# Patient Record
Sex: MALE | Race: WHITE | Employment: OTHER | URBAN - METROPOLITAN AREA
[De-identification: names, ages, dates, MRNs, and addresses within clinical notes are randomized per-mention and may not be internally consistent; named-entity substitution may affect disease eponyms.]

---

## 2017-03-01 RX ORDER — LOSARTAN POTASSIUM AND HYDROCHLOROTHIAZIDE 12.5; 1 MG/1; MG/1
TABLET ORAL
Qty: 90 TABLET | Refills: 0 | Status: SHIPPED | OUTPATIENT
Start: 2017-03-01 | End: 2017-05-04 | Stop reason: SDUPTHER

## 2017-05-04 RX ORDER — LOSARTAN POTASSIUM AND HYDROCHLOROTHIAZIDE 12.5; 1 MG/1; MG/1
TABLET ORAL
Qty: 90 TABLET | Refills: 3 | Status: SHIPPED | OUTPATIENT
Start: 2017-05-04 | End: 2018-07-23 | Stop reason: SDUPTHER

## 2017-07-24 ENCOUNTER — OFFICE VISIT (OUTPATIENT)
Dept: FAMILY MEDICINE CLINIC | Age: 72
End: 2017-07-24
Payer: COMMERCIAL

## 2017-07-24 VITALS
SYSTOLIC BLOOD PRESSURE: 128 MMHG | WEIGHT: 181.2 LBS | TEMPERATURE: 98 F | OXYGEN SATURATION: 96 % | BODY MASS INDEX: 25.27 KG/M2 | DIASTOLIC BLOOD PRESSURE: 72 MMHG

## 2017-07-24 DIAGNOSIS — J02.0 ACUTE STREPTOCOCCAL PHARYNGITIS: Primary | ICD-10-CM

## 2017-07-24 DIAGNOSIS — J44.9 CHRONIC OBSTRUCTIVE PULMONARY DISEASE, UNSPECIFIED COPD TYPE (HCC): ICD-10-CM

## 2017-07-24 PROCEDURE — 99213 OFFICE O/P EST LOW 20 MIN: CPT

## 2017-07-24 RX ORDER — CEPHALEXIN 500 MG/1
500 CAPSULE ORAL 3 TIMES DAILY
Qty: 20 CAPSULE | Refills: 0 | Status: SHIPPED | OUTPATIENT
Start: 2017-07-24 | End: 2017-07-31

## 2017-07-24 RX ORDER — PREDNISONE 20 MG/1
TABLET ORAL
Qty: 18 TABLET | Refills: 0 | Status: SHIPPED | OUTPATIENT
Start: 2017-07-24 | End: 2017-08-03

## 2017-08-01 ENCOUNTER — TELEPHONE (OUTPATIENT)
Dept: FAMILY MEDICINE CLINIC | Age: 72
End: 2017-08-01

## 2017-08-01 RX ORDER — PREDNISONE 20 MG/1
TABLET ORAL
Qty: 18 TABLET | Refills: 0 | Status: SHIPPED | OUTPATIENT
Start: 2017-08-01 | End: 2017-08-11

## 2017-08-13 ASSESSMENT — ENCOUNTER SYMPTOMS
SHORTNESS OF BREATH: 0
SORE THROAT: 1
SWOLLEN GLANDS: 0
COUGH: 1
FREQUENT THROAT CLEARING: 0
HOARSE VOICE: 0
CHEST TIGHTNESS: 1
DIFFICULTY BREATHING: 0
NAUSEA: 0
SPUTUM PRODUCTION: 0
HEMOPTYSIS: 0

## 2017-08-13 ASSESSMENT — COPD QUESTIONNAIRES: COPD: 1

## 2017-10-05 ENCOUNTER — OFFICE VISIT (OUTPATIENT)
Dept: FAMILY MEDICINE CLINIC | Age: 72
End: 2017-10-05
Payer: COMMERCIAL

## 2017-10-05 VITALS
BODY MASS INDEX: 24.6 KG/M2 | DIASTOLIC BLOOD PRESSURE: 80 MMHG | HEIGHT: 72 IN | SYSTOLIC BLOOD PRESSURE: 138 MMHG | WEIGHT: 181.6 LBS | HEART RATE: 98 BPM | OXYGEN SATURATION: 94 %

## 2017-10-05 DIAGNOSIS — J45.901 ASTHMA WITH ACUTE EXACERBATION, UNSPECIFIED ASTHMA SEVERITY: ICD-10-CM

## 2017-10-05 DIAGNOSIS — R06.02 SOB (SHORTNESS OF BREATH): ICD-10-CM

## 2017-10-05 DIAGNOSIS — R05.9 COUGH: Primary | ICD-10-CM

## 2017-10-05 DIAGNOSIS — H57.9 VISUAL COMPLAINT: ICD-10-CM

## 2017-10-05 PROCEDURE — 99214 OFFICE O/P EST MOD 30 MIN: CPT | Performed by: FAMILY MEDICINE

## 2017-10-05 RX ORDER — PREDNISONE 20 MG/1
TABLET ORAL
Qty: 18 TABLET | Refills: 0 | Status: SHIPPED | OUTPATIENT
Start: 2017-10-05 | End: 2017-10-15

## 2017-10-05 RX ORDER — DOXYCYCLINE 100 MG/1
100 CAPSULE ORAL 2 TIMES DAILY WITH MEALS
Qty: 20 CAPSULE | Refills: 0 | Status: SHIPPED | OUTPATIENT
Start: 2017-10-05 | End: 2018-01-10 | Stop reason: ALTCHOICE

## 2017-10-05 ASSESSMENT — PATIENT HEALTH QUESTIONNAIRE - PHQ9
SUM OF ALL RESPONSES TO PHQ QUESTIONS 1-9: 0
1. LITTLE INTEREST OR PLEASURE IN DOING THINGS: 0
SUM OF ALL RESPONSES TO PHQ9 QUESTIONS 1 & 2: 0
2. FEELING DOWN, DEPRESSED OR HOPELESS: 0

## 2017-10-05 NOTE — MR AVS SNAPSHOT
Hypertension (Chronic)    Ankylosing spondylitis (HCC) (Chronic)    Pernicious anemia (Chronic)    Allergic rhinitis (Chronic)      Immunizations as of 10/5/2017     Name Date    Influenza, High Dose 9/12/2017, 10/29/2016, 9/17/2015    Pneumococcal 13-valent Conjugate ([id number]) 11/14/2016    Pneumococcal Polysaccharide ([id number]) 10/7/2015, 3/25/2010    Tdap (Boostrix, Adacel) 8/16/2012      Preventive Care        Date Due    One-time abdominal aortic aneurism (AAA) screening is recommended for all men between the age of 73-68 who have ever smoked 1945    Zoster Vaccine 3/6/2005    Cholesterol Screening 10/20/2020    Tetanus Combination Vaccine (2 - Td) 8/16/2022    Colonoscopy 2/13/2025            MyChart Signup           [employer] allows you to send messages to your doctor, view your test results, renew your prescriptions, schedule appointments, view visit notes, and more. How Do I Sign Up? 1. In your Internet browser, go to [email or web address]  2. Click on the Sign Up Now link in the Sign In box. You will see the New Member Sign Up page. 3. Enter your [employer] Access Code exactly as it appears below. You will not need to use this code after youve completed the sign-up process. If you do not sign up before the expiration date, you must request a new code. [employer] Access Code: [email or web address]  Expires: 12/4/2017 10:32 AM    4. Enter your Social Security Number ([id number]) and Date of Birth (mm/dd/yyyy) as indicated and click Submit. You will be taken to the next sign-up page. 5. Create a [employer]Slap ID. This will be your [employer] login ID and cannot be changed, so think of one that is secure and easy to remember. 6. Create a [employer] password. You can change your password at any time. 7. Enter your Password Reset Question and Answer. This can be used at a later time if you forget your password.

## 2017-10-05 NOTE — PROGRESS NOTES
Visit Information    Have you changed or started any medications since your last visit including any over-the-counter medicines, vitamins, or herbal medicines? no   Have you stopped taking any of your medications? Is so, why? -  no  Are you having any side effects from any of your medications? - no    Have you seen any other physician or provider since your last visit?  no   Have you had any other diagnostic tests since your last visit?  no   Have you been seen in the emergency room and/or had an admission in a hospital since we last saw you?  no   Have you had your routine dental cleaning in the past 6 months?  yes -      Do you have an active MyChart account? If no, what is the barrier?   No:     Patient Care Team:  Lisa Izquierdo MD as PCP - General (Family Medicine)  Gerard Duarte MD as Consulting Physician (Gastroenterology)    Medical History Review  Past Medical, Family, and Social History reviewed and does not contribute to the patient presenting condition    Health Maintenance   Topic Date Due    AAA screen  1945    Zostavax vaccine  03/06/2005    Lipid screen  10/20/2020    DTaP/Tdap/Td vaccine (2 - Td) 08/16/2022    Colon cancer screen colonoscopy  02/13/2025    Flu vaccine  Completed    Pneumococcal low/med risk  Completed    Hepatitis C screen  Completed

## 2017-10-05 NOTE — PROGRESS NOTES
Subjective:  Ryan Briceño presents for   Chief Complaint   Patient presents with    Cough     clear phlegm, a little sob.  Loss of Vision     patient stated that he had two episode of partial vision loss in his left eye, white floaty in left eye did not last long and it occurred while he was on his exercise bike and on episode happen when he was just sitting       Patient originally scheduled himself for AWV, but he has a list of complaints and coughing he wanted to discuss. Has been coughing for the last few weeks. He did recover form his bronchitis in august, so this is completely separate. No fevers. Fluids  Are good. Is not using the albuterol. But he does use the nebulizer and it helps, he thinks. Overtime he notices a gradula decline in lung function, but is mauro dong his normal chores around the house. Had 2 episodes where he literally had a white out in the center of his left visual field. Both time  Affected the left eye. Lasted one or two minutes. States he had the right hand shake once and the other time the left leg shook. He cannot relate it to anything he did or med. Patient Active Problem List   Diagnosis    Hypertension    [facility]ylosing sp[facility]yl[facility] ([facility] [address]    Pernicious anemia    Allergic rhinitis       Review of Systems:  · General: no significant weight changes. · Respiratory: no cough, pleuritic chest pain, dyspnea, or wheezing  · Cardiovascular: no pain, GAITAN, orthopnea, palpitations, or claudication  · Gastrointestinal: no chronic nausea, vomiting, heartburn, diarrhea, constipation, bloating, or abdominal pain. No bloody or black stools.     Objective:  Physical Exam   Vitals:   Vitals:    10/05/17 0959   BP: 138/80   Pulse: 98   SpO2: 94%   Weight: 181 lb 9.6 oz (82.4 kg)   Height: 6' (1.829 m)     Wt Readings from Last 3 Encounters:   10/05/17 181 lb 9.6 oz (82.4 kg)   07/24/17 181 lb 3.2 oz (82.2 kg)   11/14/16 183 lb (83 kg)     Ht Readings from Last 3 Standing Expiration Date:   10/5/2018     Scheduling Instructions:       This is to be done at the hospital     rx doxy and prednisone burst     Use the advair and albuterol daily

## 2017-10-06 LAB
ABSOLUTE BASO #: 0.1 K/UL (ref 0–0.1)
ABSOLUTE EOS #: 0.7 K/UL (ref 0.1–0.4)
ABSOLUTE LYMPH #: 1.4 K/UL (ref 0.8–5.2)
ABSOLUTE MONO #: 1 K/UL (ref 0.1–0.9)
ABSOLUTE NEUT #: 3.9 K/UL (ref 1.3–9.1)
ALBUMIN SERPL-MCNC: 3.7 G/DL (ref 3.2–5.3)
ALK PHOSPHATASE: 105 IU/L (ref 35–121)
ALT SERPL-CCNC: 22 IU/L (ref 5–59)
ANION GAP SERPL CALCULATED.3IONS-SCNC: 12 MMOL/L
AST SERPL-CCNC: 26 IU/L (ref 10–42)
BASOPHILS RELATIVE PERCENT: 1.1 %
BILIRUB SERPL-MCNC: 0.6 MG/DL (ref 0.2–1.3)
BUN BLDV-MCNC: 16 MG/DL (ref 10–25)
CALCIUM SERPL-MCNC: 9.2 MG/DL (ref 8.7–10.8)
CHLORIDE BLD-SCNC: 103 MMOL/L (ref 95–111)
CO2: 32 MMOL/L (ref 21–32)
CREAT SERPL-MCNC: 1.1 MG/DL (ref 0.7–1.5)
EGFR AFRICAN AMERICAN: 80
EGFR IF NONAFRICAN AMERICAN: 66
EOSINOPHILS RELATIVE PERCENT: 9.8 %
GLUCOSE: 89 MG/DL (ref 70–100)
HCT VFR BLD CALC: 46.1 % (ref 41.4–51)
HEMOGLOBIN: 15.5 G/DL (ref 13.8–17)
LYMPHOCYTE %: 19.1 %
MCH RBC QN AUTO: 33.6 PG (ref 27–34)
MCHC RBC AUTO-ENTMCNC: 33.6 G/DL (ref 31–36)
MCV RBC AUTO: 100 FL (ref 80–100)
MONOCYTES # BLD: 14.6 %
NEUTROPHILS RELATIVE PERCENT: 55.1 %
PDW BLD-RTO: 12.5 % (ref 10.8–14.8)
PLATELETS: 285 K/UL (ref 150–450)
POTASSIUM SERPL-SCNC: 4.7 MMOL/L (ref 3.5–5.4)
RBC: 4.61 M/UL (ref 4–5.5)
SEDIMENTATION RATE, ERYTHROCYTE: 7 MM/HR (ref 0–15)
SODIUM BLD-SCNC: 142 MMOL/L (ref 134–147)
TOTAL PROTEIN: 6.7 G/DL (ref 5.8–8)
WBC: 7.1 K/UL (ref 3.7–10.8)

## 2018-01-10 ENCOUNTER — OFFICE VISIT (OUTPATIENT)
Dept: FAMILY MEDICINE CLINIC | Age: 73
End: 2018-01-10
Payer: COMMERCIAL

## 2018-01-10 VITALS
DIASTOLIC BLOOD PRESSURE: 80 MMHG | HEART RATE: 94 BPM | BODY MASS INDEX: 24.68 KG/M2 | SYSTOLIC BLOOD PRESSURE: 140 MMHG | OXYGEN SATURATION: 95 % | WEIGHT: 182 LBS

## 2018-01-10 DIAGNOSIS — J44.9 CHRONIC OBSTRUCTIVE PULMONARY DISEASE, UNSPECIFIED COPD TYPE (HCC): Primary | ICD-10-CM

## 2018-01-10 PROCEDURE — 99213 OFFICE O/P EST LOW 20 MIN: CPT | Performed by: FAMILY MEDICINE

## 2018-01-10 RX ORDER — DOXYCYCLINE 100 MG/1
100 CAPSULE ORAL 2 TIMES DAILY WITH MEALS
Qty: 20 CAPSULE | Refills: 0 | Status: SHIPPED | OUTPATIENT
Start: 2018-01-10 | End: 2018-04-10 | Stop reason: ALTCHOICE

## 2018-01-10 RX ORDER — ALBUTEROL SULFATE 90 UG/1
2 AEROSOL, METERED RESPIRATORY (INHALATION) EVERY 6 HOURS PRN
Qty: 3 INHALER | Refills: 3 | Status: SHIPPED | OUTPATIENT
Start: 2018-01-10

## 2018-01-10 RX ORDER — PREDNISONE 20 MG/1
TABLET ORAL
Qty: 18 TABLET | Refills: 0 | Status: SHIPPED | OUTPATIENT
Start: 2018-01-10 | End: 2018-01-18 | Stop reason: SDUPTHER

## 2018-01-10 NOTE — PROGRESS NOTES
Subjective:  Mirna Philippe presents for   Chief Complaint   Patient presents with    Cough     w/ sputum x 4-5 days. Has taken mucinex.  Chest Congestion     wheezing.  Fatigue   fever 2-3 days ago. Increased sob. Last felt ok over a week ago. Patient Active Problem List   Diagnosis    Hypertension    Ankylosing spondylitis ([address]    Pernicious anemia    Allergic rhinitis       Review of Systems:  · General: no significant weight changes. · Respiratory: no cough, pleuritic chest pain, dyspnea, or wheezing  · Cardiovascular: no pain, GAITAN, orthopnea, palpitations, or claudication  · Gastrointestinal: no chronic nausea, vomiting, heartburn, diarrhea, constipation, bloating, or abdominal pain. No bloody or black stools. Objective:  Physical Exam   Vitals: BP (!) 140/80   Pulse 94   Wt 182 lb (82.6 kg)   SpO2 95%   BMI 24.68 kg/m²     Vitals:    01/10/18 1340   Weight: 182 lb (82.6 kg)     Wt Readings from Last 3 Encounters:   01/10/18 182 lb (82.6 kg)   10/05/17 181 lb 9.6 oz (82.4 kg)   07/24/17 181 lb 3.2 oz (82.2 kg)     Ht Readings from Last 3 Encounters:   10/05/17 6' (1.829 m)   11/14/16 5' 11\" (1.803 m)   08/02/16 5' 11\" (1.803 m)     Body mass index is 24.68 kg/m². Constitutional: He is oriented to person, place, and time. He appears well-developed and well-nourished and in no acute distress. Answers all my questions appropriately. Head: Normocephalic and atraumatic. Eyes:conjunctiva appear normal.  Right Ear: External ear normal. TM is clear  Left Ear: External ear normal. TM is clear  Nose: pink, non-edematous mucosa. No polyps. No septal deviation  Throat: no erythema, tonsillar hypertrophy or exudate. No ulcerations noted. Lips/Teeth/Gums all appear normal.  Neck: Normal range of motion. Neck supple. No tracheal deviation present. No abnormal lymphadenopathy. No JVD noted. Carotids are clear bilaterally. No thyroid masses noted. Heart: RRR without murmur.   No S3,

## 2018-01-18 RX ORDER — PREDNISONE 20 MG/1
TABLET ORAL
Qty: 18 TABLET | Refills: 0 | Status: SHIPPED | OUTPATIENT
Start: 2018-01-18 | End: 2018-04-10 | Stop reason: SDUPTHER

## 2018-01-18 NOTE — TELEPHONE ENCOUNTER
Next Visit Date:  No future appointments.     Health Maintenance   Topic Date Due    AAA screen  1945    Zostavax vaccine  03/06/2005    Potassium monitoring  10/05/2018    Creatinine monitoring  10/05/2018    Lipid screen  10/20/2020    DTaP/Tdap/Td vaccine (2 - Td) 08/16/2022    Colon cancer screen colonoscopy  02/13/2025    Flu vaccine  Completed    Pneumococcal low/med risk  Completed    Hepatitis C screen  Completed       No results found for: LABA1C          ( goal A1C is < 7)   No results found for: LABMICR  LDL Cholesterol (mg/dL)   Date Value   08/16/2012 90     LDL Calculated (mg/dL)   Date Value   10/20/2015 90       (goal LDL is <100)   AST (IU/L)   Date Value   10/05/2017 26     ALT (IU/L)   Date Value   10/05/2017 22     BUN (mg/dl)   Date Value   10/05/2017 16     BP Readings from Last 3 Encounters:   01/10/18 (!) 140/80   10/05/17 138/80   07/24/17 128/72          (goal 120/80)    All Future Testing planned in [facility]  Lab Frequency Next Occurrence   FULL PFT STUDY WITH PRE AND POST Once 11/04/2017               Patient Active Problem List:     Hypertension     Ankylosing spondylitis ([facility] [address]     Pernicious anemia     Allergic rhinitis     Chronic obstructive pulmonary disease ([facility] [address]

## 2018-04-10 ENCOUNTER — OFFICE VISIT (OUTPATIENT)
Dept: FAMILY MEDICINE CLINIC | Age: 73
End: 2018-04-10
Payer: COMMERCIAL

## 2018-04-10 VITALS
DIASTOLIC BLOOD PRESSURE: 80 MMHG | BODY MASS INDEX: 25.19 KG/M2 | TEMPERATURE: 97.8 F | OXYGEN SATURATION: 95 % | HEART RATE: 75 BPM | RESPIRATION RATE: 16 BRPM | WEIGHT: 186 LBS | HEIGHT: 72 IN | SYSTOLIC BLOOD PRESSURE: 122 MMHG

## 2018-04-10 DIAGNOSIS — J20.9 ACUTE BRONCHITIS, UNSPECIFIED ORGANISM: Primary | ICD-10-CM

## 2018-04-10 PROCEDURE — 96372 THER/PROPH/DIAG INJ SC/IM: CPT | Performed by: NURSE PRACTITIONER

## 2018-04-10 PROCEDURE — 99213 OFFICE O/P EST LOW 20 MIN: CPT | Performed by: NURSE PRACTITIONER

## 2018-04-10 RX ORDER — METHYLPREDNISOLONE SODIUM SUCCINATE 125 MG/2ML
125 INJECTION, POWDER, LYOPHILIZED, FOR SOLUTION INTRAMUSCULAR; INTRAVENOUS ONCE
Status: COMPLETED | OUTPATIENT
Start: 2018-04-10 | End: 2018-04-10

## 2018-04-10 RX ORDER — PREDNISONE 20 MG/1
TABLET ORAL
Qty: 18 TABLET | Refills: 0 | Status: SHIPPED | OUTPATIENT
Start: 2018-04-10 | End: 2018-04-20

## 2018-04-10 RX ORDER — DOXYCYCLINE 100 MG/1
100 CAPSULE ORAL 2 TIMES DAILY WITH MEALS
Qty: 20 CAPSULE | Refills: 0 | Status: SHIPPED | OUTPATIENT
Start: 2018-04-10 | End: 2018-10-03 | Stop reason: ALTCHOICE

## 2018-04-10 RX ORDER — IPRATROPIUM BROMIDE AND ALBUTEROL SULFATE 2.5; .5 MG/3ML; MG/3ML
1 SOLUTION RESPIRATORY (INHALATION) ONCE
Status: DISCONTINUED | OUTPATIENT
Start: 2018-04-10 | End: 2018-04-10

## 2018-04-10 RX ADMIN — METHYLPREDNISOLONE SODIUM SUCCINATE 125 MG: 125 INJECTION, POWDER, LYOPHILIZED, FOR SOLUTION INTRAMUSCULAR; INTRAVENOUS at 16:24

## 2018-04-10 ASSESSMENT — ENCOUNTER SYMPTOMS
SHORTNESS OF BREATH: 1
CHOKING: 0
STRIDOR: 0
SINUS PRESSURE: 0
SORE THROAT: 0
WHEEZING: 1
CHEST TIGHTNESS: 1
HEMOPTYSIS: 0
COLOR CHANGE: 0
RHINORRHEA: 0
APNEA: 0
TROUBLE SWALLOWING: 0
EYES NEGATIVE: 1
VOICE CHANGE: 0
COUGH: 1
FACIAL SWELLING: 0
SINUS PAIN: 0

## 2018-07-23 RX ORDER — LOSARTAN POTASSIUM AND HYDROCHLOROTHIAZIDE 12.5; 1 MG/1; MG/1
TABLET ORAL
Qty: 14 TABLET | Refills: 0 | Status: SHIPPED | OUTPATIENT
Start: 2018-07-23 | End: 2018-07-26 | Stop reason: SDUPTHER

## 2018-07-23 NOTE — TELEPHONE ENCOUNTER
Next Visit Date:  No future appointments.     Health Maintenance   Topic Date Due    AAA screen  1945    Flu vaccine (1) 09/01/2018    Lipid screen  10/20/2020    DTaP/Tdap/Td vaccine (2 - Td) 08/16/2022    Colon cancer screen colonoscopy  02/13/2025    Pneumococcal low/med risk  Completed    Shingles Vaccine  Completed    Hepatitis C screen  Completed       No results found for: LABA1C          ( goal A1C is < 7)   No results found for: LABMICR  LDL Cholesterol (mg/dL)   Date Value   08/16/2012 90     LDL Calculated (mg/dL)   Date Value   10/20/2015 90   09/04/2014 105       (goal LDL is <100)   AST (IU/L)   Date Value   10/05/2017 26     ALT (IU/L)   Date Value   10/05/2017 22     BUN (mg/dl)   Date Value   10/05/2017 16     BP Readings from Last 3 Encounters:   04/10/18 122/80   01/10/18 (!) 140/80   10/05/17 138/80          (goal 120/80)    All Future Testing planned in [facility]  Lab Frequency Next Occurrence   FULL PFT STUDY WITH PRE AND POST Once 11/04/2017   XR CHEST STANDARD (2 VW) Once 06/11/2018               Patient Active Problem List:     Hypertension     Ankylosing spondylitis ([address]     Pernicious anemia     Allergic rhinitis     Chronic obstructive pulmonary disease ([address]

## 2018-07-26 RX ORDER — LOSARTAN POTASSIUM AND HYDROCHLOROTHIAZIDE 12.5; 1 MG/1; MG/1
TABLET ORAL
Qty: 90 TABLET | Refills: 1 | Status: SHIPPED | OUTPATIENT
Start: 2018-07-26 | End: 2018-12-19 | Stop reason: SDUPTHER

## 2018-10-03 ENCOUNTER — OFFICE VISIT (OUTPATIENT)
Dept: FAMILY MEDICINE CLINIC | Age: 73
End: 2018-10-03
Payer: COMMERCIAL

## 2018-10-03 VITALS
HEART RATE: 74 BPM | OXYGEN SATURATION: 97 % | WEIGHT: 184.38 LBS | DIASTOLIC BLOOD PRESSURE: 84 MMHG | BODY MASS INDEX: 25.01 KG/M2 | SYSTOLIC BLOOD PRESSURE: 150 MMHG

## 2018-10-03 DIAGNOSIS — H53.19 VISUAL DISTORTION: ICD-10-CM

## 2018-10-03 DIAGNOSIS — J44.9 CHRONIC OBSTRUCTIVE PULMONARY DISEASE, UNSPECIFIED COPD TYPE (HCC): ICD-10-CM

## 2018-10-03 DIAGNOSIS — I10 ESSENTIAL HYPERTENSION: Primary | ICD-10-CM

## 2018-10-03 DIAGNOSIS — D51.0 PERNICIOUS ANEMIA: ICD-10-CM

## 2018-10-03 PROCEDURE — 99214 OFFICE O/P EST MOD 30 MIN: CPT | Performed by: FAMILY MEDICINE

## 2018-10-03 RX ORDER — ASPIRIN 81 MG/1
81 TABLET, CHEWABLE ORAL 2 TIMES DAILY
COMMUNITY

## 2018-10-03 ASSESSMENT — PATIENT HEALTH QUESTIONNAIRE - PHQ9
1. LITTLE INTEREST OR PLEASURE IN DOING THINGS: 0
SUM OF ALL RESPONSES TO PHQ QUESTIONS 1-9: 0
SUM OF ALL RESPONSES TO PHQ QUESTIONS 1-9: 0
2. FEELING DOWN, DEPRESSED OR HOPELESS: 0
SUM OF ALL RESPONSES TO PHQ9 QUESTIONS 1 & 2: 0

## 2018-10-03 NOTE — LETTER
Ordering Provider: Stefanie MELARA   Vitamin B12   Result Value Ref Range    Vitamin B-12 936.8 200 - 950 pg/mL      Comment:      Pathology [address] [facility], [address] [facility], [place]  : Thomas Smith. Zeb Resendez M.D.  Debra Byrnes No. [id number]   CAP Accreditation No. [id number]      Narrative    Ordering Provider: Angy Spicer. KELTON       Test results are essentially normal. Stable compared to previous     If you have any questions or concerns, please don't hesitate to call.     Sincerely,    [facility]

## 2018-10-05 LAB
ABSOLUTE BASO #: 0.1 K/UL (ref 0–0.1)
ABSOLUTE EOS #: 0.3 K/UL (ref 0.1–0.4)
ABSOLUTE LYMPH #: 1.7 K/UL (ref 0.8–5.2)
ABSOLUTE MONO #: 0.8 K/UL (ref 0.1–0.9)
ABSOLUTE NEUT #: 3.9 K/UL (ref 1.3–9.1)
ALBUMIN SERPL-MCNC: 3.9 G/DL (ref 3.5–5.2)
ALK PHOSPHATASE: 101 U/L (ref 39–118)
ALT SERPL-CCNC: 17 U/L (ref 5–50)
ANION GAP SERPL CALCULATED.3IONS-SCNC: 12 MEQ/L (ref 10–19)
AST SERPL-CCNC: 22 U/L (ref 9–50)
BASOPHILS RELATIVE PERCENT: 0.9 %
BILIRUB SERPL-MCNC: 0.4 MG/DL
BUN BLDV-MCNC: 14 MG/DL (ref 8–23)
CALCIUM SERPL-MCNC: 9.3 MG/DL (ref 8.5–10.5)
CHLORIDE BLD-SCNC: 100 MEQ/L (ref 95–107)
CHOLESTEROL/HDL RATIO: 3.3
CHOLESTEROL: 162 MG/DL
CO2: 28 MEQ/L (ref 19–31)
CREAT SERPL-MCNC: 1.2 MG/DL (ref 0.8–1.4)
EGFR AFRICAN AMERICAN: 69.1 ML/MIN/1.73 M2
EGFR IF NONAFRICAN AMERICAN: 59.6 ML/MIN/1.73 M2
EOSINOPHILS RELATIVE PERCENT: 4.4 %
GLUCOSE: 89 MG/DL (ref 70–99)
HCT VFR BLD CALC: 43.9 % (ref 41.4–51)
HDLC SERPL-MCNC: 49 MG/DL
HEMOGLOBIN: 15.7 G/DL (ref 13.8–17)
LDL CHOLESTEROL CALCULATED: 93 MG/DL
LDL/HDL RATIO: 1.9
LYMPHOCYTE %: 24.2 %
MCH RBC QN AUTO: 35 PG (ref 27–34)
MCHC RBC AUTO-ENTMCNC: 35.8 G/DL (ref 31–36)
MCV RBC AUTO: 98 FL (ref 80–100)
MONOCYTES # BLD: 12.3 %
NEUTROPHILS RELATIVE PERCENT: 57.9 %
PDW BLD-RTO: 12.5 % (ref 10.8–14.8)
PLATELETS: 306 K/UL (ref 150–450)
POTASSIUM SERPL-SCNC: 4.3 MEQ/L (ref 3.5–5.4)
RBC: 4.48 M/UL (ref 4–5.5)
SODIUM BLD-SCNC: 140 MEQ/L (ref 135–146)
TOTAL PROTEIN: 6.9 G/DL (ref 6.1–8.3)
TRIGL SERPL-MCNC: 101 MG/DL
VITAMIN B-12: 936.8 PG/ML (ref 200–950)
VLDLC SERPL CALC-MCNC: 20 MG/DL
WBC: 6.8 K/UL (ref 3.7–10.8)

## 2018-10-06 LAB — TSH SERPL DL<=0.05 MIU/L-ACNC: 3.83 UIU/ML (ref 0.4–4.1)

## 2018-12-03 ENCOUNTER — HOSPITAL ENCOUNTER (OUTPATIENT)
Age: 73
Setting detail: SPECIMEN
Discharge: HOME | End: 2018-12-03
Payer: COMMERCIAL

## 2018-12-03 ENCOUNTER — OFFICE VISIT (OUTPATIENT)
Dept: FAMILY MEDICINE CLINIC | Age: 73
End: 2018-12-03
Payer: COMMERCIAL

## 2018-12-03 VITALS
BODY MASS INDEX: 25.16 KG/M2 | WEIGHT: 185.5 LBS | DIASTOLIC BLOOD PRESSURE: 80 MMHG | HEART RATE: 99 BPM | SYSTOLIC BLOOD PRESSURE: 134 MMHG | OXYGEN SATURATION: 92 %

## 2018-12-03 DIAGNOSIS — I10 ESSENTIAL HYPERTENSION: ICD-10-CM

## 2018-12-03 DIAGNOSIS — H53.9 VISION CHANGES: Primary | ICD-10-CM

## 2018-12-03 DIAGNOSIS — H53.9 VISION CHANGES: ICD-10-CM

## 2018-12-03 DIAGNOSIS — G45.3 AMAUROSIS FUGAX: ICD-10-CM

## 2018-12-03 LAB — SEDIMENTATION RATE, ERYTHROCYTE: 8 MM (ref 0–10)

## 2018-12-03 PROCEDURE — 99214 OFFICE O/P EST MOD 30 MIN: CPT | Performed by: FAMILY MEDICINE

## 2018-12-19 RX ORDER — LOSARTAN POTASSIUM AND HYDROCHLOROTHIAZIDE 12.5; 1 MG/1; MG/1
TABLET ORAL
Qty: 90 TABLET | Refills: 3 | Status: SHIPPED | OUTPATIENT
Start: 2018-12-19 | End: 2019-01-09

## 2019-01-08 ENCOUNTER — OFFICE VISIT (OUTPATIENT)
Dept: FAMILY MEDICINE CLINIC | Age: 74
End: 2019-01-08
Payer: COMMERCIAL

## 2019-01-08 VITALS
OXYGEN SATURATION: 95 % | BODY MASS INDEX: 25.02 KG/M2 | DIASTOLIC BLOOD PRESSURE: 90 MMHG | HEART RATE: 84 BPM | SYSTOLIC BLOOD PRESSURE: 150 MMHG | WEIGHT: 184.5 LBS

## 2019-01-08 DIAGNOSIS — J44.9 CHRONIC OBSTRUCTIVE PULMONARY DISEASE, UNSPECIFIED COPD TYPE (HCC): Chronic | ICD-10-CM

## 2019-01-08 DIAGNOSIS — I10 ESSENTIAL HYPERTENSION: Primary | Chronic | ICD-10-CM

## 2019-01-08 PROCEDURE — 99213 OFFICE O/P EST LOW 20 MIN: CPT | Performed by: FAMILY MEDICINE

## 2019-01-09 ENCOUNTER — TELEPHONE (OUTPATIENT)
Dept: FAMILY MEDICINE CLINIC | Age: 74
End: 2019-01-09

## 2019-01-09 RX ORDER — VALSARTAN AND HYDROCHLOROTHIAZIDE 160; 12.5 MG/1; MG/1
1 TABLET, FILM COATED ORAL DAILY
Qty: 90 TABLET | Refills: 3 | Status: SHIPPED | OUTPATIENT
Start: 2019-01-09

## 2019-01-22 ENCOUNTER — TELEPHONE (OUTPATIENT)
Dept: FAMILY MEDICINE CLINIC | Age: 74
End: 2019-01-22

## 2019-04-05 ENCOUNTER — OFFICE VISIT (OUTPATIENT)
Dept: FAMILY MEDICINE CLINIC | Age: 74
End: 2019-04-05
Payer: COMMERCIAL

## 2019-04-05 VITALS
WEIGHT: 184.2 LBS | DIASTOLIC BLOOD PRESSURE: 76 MMHG | BODY MASS INDEX: 24.98 KG/M2 | SYSTOLIC BLOOD PRESSURE: 140 MMHG | OXYGEN SATURATION: 97 % | HEART RATE: 101 BPM

## 2019-04-05 DIAGNOSIS — I10 ESSENTIAL HYPERTENSION: Primary | ICD-10-CM

## 2019-04-05 PROCEDURE — 99213 OFFICE O/P EST LOW 20 MIN: CPT | Performed by: FAMILY MEDICINE

## 2019-04-05 ASSESSMENT — PATIENT HEALTH QUESTIONNAIRE - PHQ9
SUM OF ALL RESPONSES TO PHQ QUESTIONS 1-9: 0
2. FEELING DOWN, DEPRESSED OR HOPELESS: 0
1. LITTLE INTEREST OR PLEASURE IN DOING THINGS: 0
SUM OF ALL RESPONSES TO PHQ QUESTIONS 1-9: 0
SUM OF ALL RESPONSES TO PHQ9 QUESTIONS 1 & 2: 0

## 2019-04-05 NOTE — PROGRESS NOTES
Subjective:  Ryann Dominguez presents for   Chief Complaint   Patient presents with    Hypertension       Tolerating the meds. Had 2 eye spells in the past 4 months    Will be moving to [place] in the fall    Lungs have been great all winter    avg bp at home is about 120/71  Patient Active Problem List   Diagnosis    Hypertension    [facility]ylosing spondylitis ([facility] [address]    Pernicious anemia    Allergic rhinitis    Chronic obstructive pulmonary disease ([facility] [address]       Review of Systems:  · General: no significant weight changes. · Respiratory: no cough, pleuritic chest pain, dyspnea, or wheezing  · Cardiovascular: no pain, GAITAN, orthopnea, palpitations, or claudication  · Gastrointestinal: no chronic nausea, vomiting, heartburn, diarrhea, constipation, bloating, or abdominal pain. No bloody or black stools. Objective:  Physical Exam   Vitals:   Vitals:    04/05/19 0903   BP: (!) 148/82   Pulse: 101   SpO2: 97%   Weight: 184 lb 3.2 oz (83.6 kg)     Wt Readings from Last 3 Encounters:   04/05/19 184 lb 3.2 oz (83.6 kg)   01/08/19 184 lb 8 oz (83.7 kg)   12/03/18 185 lb 8 oz (84.1 kg)     Ht Readings from Last 3 Encounters:   04/10/18 6' (1.829 m)   10/05/17 6' (1.829 m)   11/14/16 5' 11\" (1.803 m)     Body mass index is 24.98 kg/m². Constitutional: He is oriented to person, place, and time. He appears well-developed and well-nourished and in no acute distress. Answers all my questions appropriately. Head: Normocephalic and atraumatic. Eyes:conjunctiva appear normal.  Nose: pink, non-edematous mucosa. No polyps. No septal deviation  Throat: no erythema, tonsillar hypertrophy or exudate. No ulcerations noted. Lips/Teeth/Gums all appear normal.  Neck: Normal range of motion. Neck supple. No tracheal deviation present. No abnormal lymphadenopathy. No JVD noted. Carotids are clear bilaterally. No thyroid masses noted. Heart: RRR without murmur. No S3, S4, or gallop noted. Chest: Clear to auscultation bilaterally. Good breath sounds noted. No rales, wheezes, or rhonchi noted. No respiratory retractions noted. Wall has symmetrical movement with respirations. Assessment:   Encounter Diagnosis   Name Primary?  Essential hypertension Yes         Plan:   There are no discontinued medications. THE ABOVE NOTED DISCONTINUED MEDS MAY ONLY BE FROM 'CLEANING UP' THE MED LIST AND WERE NOT ACTUALLY CANCELED;  SEE CHART FOR DETAILS! No orders of the defined types were placed in this encounter. Orders Placed This Encounter   Procedures    CBC Auto Differential     Standing Status:   Future     Standing Expiration Date:   4/5/2020    Comprehensive Metabolic Panel     Standing Status:   Future     Standing Expiration Date:   4/5/2020    Lipid Panel     Standing Status:   Future     Standing Expiration Date:   4/5/2020     Order Specific Question:   Is Patient Fasting?/# of Hours     Answer:   8 hour fasting ( no calories)    TSH with Reflex     Standing Status:   Future     Standing Expiration Date:   4/5/2020     Return in about 6 months (around 10/5/2019). There are no Patient Instructions on file for this visit.   Data Unavailable

## 2019-05-14 ENCOUNTER — TELEPHONE (OUTPATIENT)
Dept: FAMILY MEDICINE CLINIC | Age: 74
End: 2019-05-14

## 2019-05-14 NOTE — TELEPHONE ENCOUNTER
Could be viral which requires symptomatic treatment, however appointment for evaluation if sx worsen or fail to resolve

## 2019-05-14 NOTE — TELEPHONE ENCOUNTER
Acute respiratory issue     Patient complains of  Chest congestion, productive cough,no fever,voice hoarse, no body aches,       Symptoms for how long 2 days       What meds has pt tried  Sudafed , deltanvim cough syrup,     Does patient have asthma  Yes    Is patient on inhalers  Yes for asthma    Other symptoms include  No      Is this sinus, cold or cough related   Unsure        Pt is in [facility]. I have added the pharmacy onto his chart        *This condition is eligible for an eVisit. If not already active, sign patient up for MyChart to improve access and communication with the provider. *

## 2019-07-29 ENCOUNTER — TELEPHONE (OUTPATIENT)
Dept: FAMILY MEDICINE CLINIC | Age: 74
End: 2019-07-29

## 2019-07-30 ENCOUNTER — OFFICE VISIT (OUTPATIENT)
Dept: FAMILY MEDICINE CLINIC | Age: 74
End: 2019-07-30
Payer: COMMERCIAL

## 2019-07-30 VITALS
RESPIRATION RATE: 14 BRPM | DIASTOLIC BLOOD PRESSURE: 88 MMHG | BODY MASS INDEX: 24.6 KG/M2 | HEART RATE: 76 BPM | WEIGHT: 181.4 LBS | TEMPERATURE: 98.4 F | OXYGEN SATURATION: 92 % | SYSTOLIC BLOOD PRESSURE: 150 MMHG

## 2019-07-30 DIAGNOSIS — Z09 HOSPITAL DISCHARGE FOLLOW-UP: ICD-10-CM

## 2019-07-30 DIAGNOSIS — R29.898 LEFT LEG WEAKNESS: ICD-10-CM

## 2019-07-30 DIAGNOSIS — H53.8 BLURRED VISION: Primary | ICD-10-CM

## 2019-07-30 DIAGNOSIS — I10 ESSENTIAL HYPERTENSION: Chronic | ICD-10-CM

## 2019-07-30 PROCEDURE — 99215 OFFICE O/P EST HI 40 MIN: CPT | Performed by: NURSE PRACTITIONER

## 2019-07-30 ASSESSMENT — ENCOUNTER SYMPTOMS
SORE THROAT: 0
DIARRHEA: 0
EYE REDNESS: 0
EYE PAIN: 0
EYE ITCHING: 0
VOMITING: 0
RESPIRATORY NEGATIVE: 1
PHOTOPHOBIA: 0
BACK PAIN: 0
COLOR CHANGE: 0
COUGH: 0
EYE DISCHARGE: 0
NAUSEA: 0
SINUS PRESSURE: 1
SHORTNESS OF BREATH: 0
GASTROINTESTINAL NEGATIVE: 1

## 2019-07-30 ASSESSMENT — PATIENT HEALTH QUESTIONNAIRE - PHQ9
SUM OF ALL RESPONSES TO PHQ QUESTIONS 1-9: 0
SUM OF ALL RESPONSES TO PHQ9 QUESTIONS 1 & 2: 0
1. LITTLE INTEREST OR PLEASURE IN DOING THINGS: 0
2. FEELING DOWN, DEPRESSED OR HOPELESS: 0
SUM OF ALL RESPONSES TO PHQ QUESTIONS 1-9: 0

## 2019-07-30 NOTE — PROGRESS NOTES
[facility]  [address]  Dept: [phone]    Visit Information    Have you changed or started any medications since your last visit including any over-the-counter medicines, vitamins, or herbal medicines? no   Have you stopped taking any of your medications? Is so, why? -  no  Are you having any side effects from any of your medications? - no    Have you seen any other physician or provider since your last visit? yes - Admitted to [facility]   Have you had any other diagnostic tests since your last visit? yes    Have you been seen in the emergency room and/or had an admission in a hospital since we last saw you?  yes - [facility]   Have you had your routine dental cleaning in the past 6 months?  no     Do you have an active MyChart account? If no, what is the barrier? No    Patient Care Team:  Pablo Samuel MD as PCP - General (Family Medicine)  Pablo Samuel MD as PCP - [facility] Provider  Jim Tee MD as Consulting Physician (Gastroenterology)    Medical History Review  Past Medical, Family, and Social History reviewed and does not contribute to the patient presenting condition    Health Maintenance   Topic Date Due    AAA screen  1945    Flu vaccine (1) 09/01/2019    Potassium monitoring  10/04/2019    Creatinine monitoring  10/04/2019    DTaP/Tdap/Td vaccine (2 - Td) 08/16/2022    Lipid screen  10/04/2023    Colon cancer screen colonoscopy  02/13/2025    Shingles Vaccine  Completed    Pneumococcal 65+ years Vaccine  Completed    Hepatitis C screen  Completed                 Claude Grimm is a 76 y.o. male who presents today for his medical conditions/complaintsas noted below.       Claude Grimm is here today c/o Follow-Up from Hospital (Admitted in [facility] for possible stroke - all tests came back negative) and Other (Left sided weakness, double vision)    Past Medical History:   Diagnosis Date    Allergic

## 2023-01-09 ENCOUNTER — OFFICE (OUTPATIENT)
Dept: URBAN - METROPOLITAN AREA CLINIC 17 | Facility: CLINIC | Age: 78
End: 2023-01-09

## 2023-01-09 VITALS
TEMPERATURE: 98.6 F | HEIGHT: 71 IN | HEART RATE: 89 BPM | DIASTOLIC BLOOD PRESSURE: 80 MMHG | OXYGEN SATURATION: 98 % | WEIGHT: 182 LBS | SYSTOLIC BLOOD PRESSURE: 132 MMHG

## 2023-01-09 DIAGNOSIS — G45.9 TRANSIENT CEREBRAL ISCHEMIC ATTACK, UNSPECIFIED: ICD-10-CM

## 2023-01-09 DIAGNOSIS — K21.9 GASTRO-ESOPHAGEAL REFLUX DISEASE WITHOUT ESOPHAGITIS: ICD-10-CM

## 2023-01-09 DIAGNOSIS — M45.9 ANKYLOSING SPONDYLITIS OF UNSPECIFIED SITES IN SPINE: ICD-10-CM

## 2023-01-09 DIAGNOSIS — Z86.010 PERSONAL HISTORY OF COLONIC POLYPS: ICD-10-CM

## 2023-01-09 DIAGNOSIS — R13.10 DYSPHAGIA, UNSPECIFIED: ICD-10-CM

## 2023-01-09 DIAGNOSIS — K57.90 DIVERTICULOSIS OF INTESTINE, PART UNSPECIFIED, WITHOUT PERFO: ICD-10-CM

## 2023-01-09 PROCEDURE — 99204 OFFICE O/P NEW MOD 45 MIN: CPT | Performed by: INTERNAL MEDICINE

## 2023-02-01 ENCOUNTER — AMBULATORY SURGICAL CENTER (OUTPATIENT)
Dept: URBAN - METROPOLITAN AREA SURGERY 5 | Facility: SURGERY | Age: 78
End: 2023-02-01
Payer: MEDICARE

## 2023-02-01 ENCOUNTER — AMBULATORY SURGICAL CENTER (OUTPATIENT)
Dept: URBAN - METROPOLITAN AREA SURGERY 5 | Facility: SURGERY | Age: 78
End: 2023-02-01

## 2023-02-01 VITALS
TEMPERATURE: 97 F | HEIGHT: 71 IN | HEART RATE: 69 BPM | OXYGEN SATURATION: 99 % | DIASTOLIC BLOOD PRESSURE: 76 MMHG | OXYGEN SATURATION: 95 % | DIASTOLIC BLOOD PRESSURE: 71 MMHG | HEART RATE: 87 BPM | HEART RATE: 85 BPM | DIASTOLIC BLOOD PRESSURE: 71 MMHG | HEIGHT: 71 IN | SYSTOLIC BLOOD PRESSURE: 139 MMHG | SYSTOLIC BLOOD PRESSURE: 140 MMHG | SYSTOLIC BLOOD PRESSURE: 124 MMHG | OXYGEN SATURATION: 89 % | SYSTOLIC BLOOD PRESSURE: 133 MMHG | RESPIRATION RATE: 6 BRPM | OXYGEN SATURATION: 95 % | SYSTOLIC BLOOD PRESSURE: 132 MMHG | SYSTOLIC BLOOD PRESSURE: 133 MMHG | WEIGHT: 180 LBS | DIASTOLIC BLOOD PRESSURE: 80 MMHG | HEART RATE: 78 BPM | SYSTOLIC BLOOD PRESSURE: 150 MMHG | DIASTOLIC BLOOD PRESSURE: 67 MMHG | SYSTOLIC BLOOD PRESSURE: 167 MMHG | OXYGEN SATURATION: 82 % | OXYGEN SATURATION: 99 % | HEART RATE: 75 BPM | OXYGEN SATURATION: 98 % | DIASTOLIC BLOOD PRESSURE: 65 MMHG | OXYGEN SATURATION: 96 % | RESPIRATION RATE: 7 BRPM | DIASTOLIC BLOOD PRESSURE: 75 MMHG | RESPIRATION RATE: 7 BRPM | OXYGEN SATURATION: 89 % | HEART RATE: 69 BPM | HEART RATE: 85 BPM | DIASTOLIC BLOOD PRESSURE: 80 MMHG | WEIGHT: 180 LBS | DIASTOLIC BLOOD PRESSURE: 67 MMHG | OXYGEN SATURATION: 92 % | RESPIRATION RATE: 16 BRPM | DIASTOLIC BLOOD PRESSURE: 75 MMHG | HEART RATE: 75 BPM | DIASTOLIC BLOOD PRESSURE: 73 MMHG | HEART RATE: 88 BPM | SYSTOLIC BLOOD PRESSURE: 195 MMHG | HEART RATE: 95 BPM | OXYGEN SATURATION: 82 % | HEART RATE: 87 BPM | HEART RATE: 78 BPM | SYSTOLIC BLOOD PRESSURE: 124 MMHG | RESPIRATION RATE: 16 BRPM | OXYGEN SATURATION: 94 % | RESPIRATION RATE: 6 BRPM | HEART RATE: 79 BPM | SYSTOLIC BLOOD PRESSURE: 167 MMHG | SYSTOLIC BLOOD PRESSURE: 140 MMHG | SYSTOLIC BLOOD PRESSURE: 158 MMHG | DIASTOLIC BLOOD PRESSURE: 77 MMHG | OXYGEN SATURATION: 94 % | HEART RATE: 51 BPM | DIASTOLIC BLOOD PRESSURE: 65 MMHG | OXYGEN SATURATION: 96 % | RESPIRATION RATE: 10 BRPM | OXYGEN SATURATION: 92 % | HEART RATE: 88 BPM | HEART RATE: 95 BPM | DIASTOLIC BLOOD PRESSURE: 68 MMHG | TEMPERATURE: 97 F | SYSTOLIC BLOOD PRESSURE: 158 MMHG | OXYGEN SATURATION: 98 % | RESPIRATION RATE: 10 BRPM | DIASTOLIC BLOOD PRESSURE: 76 MMHG | DIASTOLIC BLOOD PRESSURE: 73 MMHG | SYSTOLIC BLOOD PRESSURE: 150 MMHG | HEART RATE: 51 BPM | SYSTOLIC BLOOD PRESSURE: 195 MMHG | SYSTOLIC BLOOD PRESSURE: 139 MMHG | DIASTOLIC BLOOD PRESSURE: 77 MMHG | HEART RATE: 79 BPM | DIASTOLIC BLOOD PRESSURE: 68 MMHG | SYSTOLIC BLOOD PRESSURE: 132 MMHG

## 2023-02-01 DIAGNOSIS — K31.1 ADULT HYPERTROPHIC PYLORIC STENOSIS: ICD-10-CM

## 2023-02-01 PROCEDURE — 43245 EGD DILATE STRICTURE: CPT | Performed by: INTERNAL MEDICINE

## 2023-02-01 RX ADMIN — ALBUTEROL SULFATE INHALER: 90 AEROSOL, METERED RESPIRATORY (INHALATION) at 13:30

## 2023-02-01 NOTE — SERVICENOTES
Patient has had previous esophagectomy.  GE junction is at 27 cm from the incisors.  Portion of the esophagus noted is normal.  Anastomosis is of normal size.  Undigested food and liquid made access to the pylorus difficult.  Pylorus could not be exactly assessed.  Regurgitation was performed.

## 2023-02-01 NOTE — SERVICEHPINOTES
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 Previous history of anastomotic stricture that was dilated. Patient is now presenting with symptoms of LPR and dysphagia.. Patient also has hoarseness of voice and constant clearing of throat.

## 2023-04-06 ENCOUNTER — OFFICE (OUTPATIENT)
Dept: URBAN - METROPOLITAN AREA CLINIC 17 | Facility: CLINIC | Age: 78
End: 2023-04-06

## 2023-04-06 VITALS
HEART RATE: 81 BPM | HEIGHT: 71 IN | OXYGEN SATURATION: 96 % | SYSTOLIC BLOOD PRESSURE: 140 MMHG | WEIGHT: 176.6 LBS | TEMPERATURE: 97.6 F | DIASTOLIC BLOOD PRESSURE: 82 MMHG

## 2023-04-06 DIAGNOSIS — Z87.19 PERSONAL HISTORY OF OTHER DISEASES OF THE DIGESTIVE SYSTEM: ICD-10-CM

## 2023-04-06 DIAGNOSIS — Z98.890 OTHER SPECIFIED POSTPROCEDURAL STATES: ICD-10-CM

## 2023-04-06 DIAGNOSIS — K21.9 GASTRO-ESOPHAGEAL REFLUX DISEASE WITHOUT ESOPHAGITIS: ICD-10-CM

## 2023-04-06 DIAGNOSIS — M45.9 ANKYLOSING SPONDYLITIS OF UNSPECIFIED SITES IN SPINE: ICD-10-CM

## 2023-04-06 DIAGNOSIS — K31.1 ADULT HYPERTROPHIC PYLORIC STENOSIS: ICD-10-CM

## 2023-04-06 PROCEDURE — 99214 OFFICE O/P EST MOD 30 MIN: CPT | Performed by: INTERNAL MEDICINE
